# Patient Record
(demographics unavailable — no encounter records)

---

## 2025-03-31 NOTE — HISTORY OF PRESENT ILLNESS
[FreeTextEntry1] : 61 year old woman with past medical history migraines referred by Dr. Ashley Gupta for neurosurgical evaluation of cerebral aneurysm.  The patient primarily speaks Telugu. Translation is provided via [employer], ID #  Ms. Menjivar states that she saw ENT MD due to RIGHT ear tinnitus and decreased hearing in RIGHT ear. Workup included MRA head without contrast which was done on 8/11/23 and demonstrated a 5 mm LEFT ICA aneurysm.  Denies family history of aneurysms. Denies smoking history.  Returns to review CTA Head with and without contrast done on 9/5/23. Continues to report headaches, unrelieved by gabapentin.  3/18/2024- returns to review MRA, aneurysm is stable. Continues to have daily headaches, follows with Dr. Lott. Taking nortriptyline and rizatriptan without relief.  Recommended repeat MRA head in 1 year for aneurysm surveillance.   TODAY: presents to review annual MRA; aneurysm is stable.

## 2025-03-31 NOTE — PHYSICAL EXAM
[General Appearance - Alert] : alert [General Appearance - In No Acute Distress] : in no acute distress [Person] : oriented to person [Place] : oriented to place [Time] : oriented to time [Short Term Intact] : short term memory intact [Remote Intact] : remote memory intact [Span Intact] : the attention span was normal [Concentration Intact] : normal concentrating ability [Fluency] : fluency intact [Comprehension] : comprehension intact [Current Events] : adequate knowledge of current events [Past History] : adequate knowledge of personal past history [Vocabulary] : adequate range of vocabulary [Cranial Nerves Optic (II)] : visual acuity intact bilaterally,  pupils equal round and reactive to light [Cranial Nerves Oculomotor (III)] : extraocular motion intact [Cranial Nerves Trigeminal (V)] : facial sensation intact symmetrically [Cranial Nerves Facial (VII)] : face symmetrical [Cranial Nerves Vestibulocochlear (VIII)] : hearing was intact bilaterally [Cranial Nerves Glossopharyngeal (IX)] : tongue and palate midline [Cranial Nerves Accessory (XI - Cranial And Spinal)] : head turning and shoulder shrug symmetric [Cranial Nerves Hypoglossal (XII)] : there was no tongue deviation with protrusion [Motor Tone] : muscle tone was normal in all four extremities [Motor Strength] : muscle strength was normal in all four extremities [No Muscle Atrophy] : normal bulk in all four extremities [Sensation Tactile Decrease] : light touch was intact [Abnormal Walk] : normal gait [Balance] : balance was intact [2+] : Patella left 2+ [Over the Past 2 Weeks, Have You Felt Down, Depressed, or Hopeless?] : 1.) Over the past 2 weeks, have you felt down, depressed, or hopeless? No [Over the Past 2 Weeks, Have You Felt Little Interest or Pleasure Doing Things?] : 2.) Over the past 2 weeks, have you felt little interest or pleasure doing things? No [Past-pointing] : there was no past-pointing [Tremor] : no tremor present

## 2025-03-31 NOTE — HISTORY OF PRESENT ILLNESS
[FreeTextEntry1] : 61 year old woman with past medical history migraines referred by Dr. Ashley Gupta for neurosurgical evaluation of cerebral aneurysm.  The patient primarily speaks Setswana. Translation is provided via [employer], ID #  Ms. Menjivar states that she saw ENT MD due to RIGHT ear tinnitus and decreased hearing in RIGHT ear. Workup included MRA head without contrast which was done on 8/11/23 and demonstrated a 5 mm LEFT ICA aneurysm.  Denies family history of aneurysms. Denies smoking history.  Returns to review CTA Head with and without contrast done on 9/5/23. Continues to report headaches, unrelieved by gabapentin.  3/18/2024- returns to review MRA, aneurysm is stable. Continues to have daily headaches, follows with Dr. Lott. Taking nortriptyline and rizatriptan without relief.  Recommended repeat MRA head in 1 year for aneurysm surveillance.   TODAY: presents to review annual MRA; aneurysm is stable.

## 2025-03-31 NOTE — DATA REVIEWED
[de-identified] : [id number] EXAM: CT ANGIO BRAIN (W)AW IC ORDERED BY: LEXX DELGADO  PROCEDURE DATE: 09/05/2023    INTERPRETATION: CLINICAL INDICATIONS:LEFT ICA ANEURYSM  TECHNIQUE: CTA brain and neck. 80cc Isovue-370 Intravenous contrast was administered. 2-D MIP images. 20cc of contrast was discarded.  COMPARISON: MRA brain dated 8/5/2023  FINDINGS:  CTA BRAIN: Left supraclinoid internal carotid artery posterior inferiorly oriented aneurysm on image 30 of series 8 measures 4.2 x 4.7 x 4.7 mm, unchanged as compared to the recent MRI of the brain. The [place] of [place] and vertebrobasilar system are otherwise unremarkable without evidence of stenosis, occlusion or other saccular aneurysm dilation. No evidence for arterial venous malformation. The vertebral arteries are codominant.  Mild irregularity involving the right cervical internal carotid artery on image 37 of series 9 measuring suggest fibromuscular dysplasia.   IMPRESSION:  Left supraclinoid internal carotid artery posterior inferiorly oriented aneurysm measures 4.2 x 4.7 x 4.7 mm.  --- End of Report ---    [de-identified] : @ [facility] 3/15/2025 EXAM:  MR ANGIOGRAPHY BRAIN WITHOUT CONTRAST  HISTORY:  Headaches. Aneurysm.  TECHNIQUE:  Magnetic resonance angiography of intracranial circulation was performed on a 3T MR unit centered at the [place] of [place] with three-dimensional time of flight technique and targeted maximum intensity projection reconstructions.   COMPARISON:  MRA brain 2/22/2024.  FINDINGS:    Anterior circulation: Stable 6 mm inferiorly projecting left paraclinoid ICA aneurysm. Otherwise no evidence for large vessel occlusion, high grade stenosis, or large aneurysm. Posterior circulation: No evidence for large vessel occlusion, high grade stenosis, or large aneurysm.  IMPRESSION:  Stable 6 mm inferiorly projecting left paraclinoid ICA aneurysm.   Thank you for the opportunity to participate in the care of this patient.      @ [facility] 2- EXAM:  MR ANGIOGRAPHY BRAIN WITHOUT CONTRAST  HISTORY: Headache.  TECHNIQUE:  Magnetic resonance angiography of intracranial circulation was performed on a 3T MR unit centered at the [place] of [place] with three-dimensional time of flight technique and targeted maximum intensity projection reconstructions.   COMPARISON: 08/05/2023 MRA brain.  FINDINGS: 4 x 4 mm aneurysm arises off the undersurface of the proximal LEFT supraclinoid ICA and remains stable. There is no new aneurysm demonstrated. Vertebrobasilar junctions are patent, BILATERALLY. Codominant vertebral artery morphology.  IMPRESSION: 1.  Stable 4 x 4 mm aneurysm, proximal LEFT supraclinoid ICA.   Thank you for the opportunity to participate in the care of this patient.

## 2025-03-31 NOTE — HISTORY OF PRESENT ILLNESS
[FreeTextEntry1] : 61 year old woman with past medical history migraines referred by Dr. Ashley Gupta for neurosurgical evaluation of cerebral aneurysm.  The patient primarily speaks Vietnamese. Translation is provided via [employer], ID #  Ms. Menjivar states that she saw ENT MD due to RIGHT ear tinnitus and decreased hearing in RIGHT ear. Workup included MRA head without contrast which was done on 8/11/23 and demonstrated a 5 mm LEFT ICA aneurysm.  Denies family history of aneurysms. Denies smoking history.  Returns to review CTA Head with and without contrast done on 9/5/23. Continues to report headaches, unrelieved by gabapentin.  3/18/2024- returns to review MRA, aneurysm is stable. Continues to have daily headaches, follows with Dr. Lott. Taking nortriptyline and rizatriptan without relief.  Recommended repeat MRA head in 1 year for aneurysm surveillance.   TODAY: presents to review annual MRA; aneurysm is stable.

## 2025-03-31 NOTE — ASSESSMENT
[FreeTextEntry1] :  I have discussed the natural history and treatment options for aneurysms with the patient. I explained that a variety of factors must be taken into consideration when managing cerebral aneurysm, namely the lesion size, shape, location, and personal/family history, and patient's age. I informed the patient that the main risk with cerebral aneurysms is rupture, leading to Subarachnoid Hemorrhage. I detailed the consequences of SAH, including its morbidity and mortality. I then presented the options of 1) conservative management with serial imaging, 2) aneurysm repair via endovascular approaches vs microsurgery.  I thoroughly discussed the indications, risks, possible complications, and expected outcomes of each treatment option with the patient. In the end, I recommend she undergo a cerebral angiogram to better assess the 6mm L paraclinoid ICA aneurysm.  - referred to Dr. Gonzalez - [facility] to review cerebral angiogram with Dr. Underwood after completed  Patient and patient's family verbalize agreement and understanding with plan of care.  I, Dr. Domingo Underwood, personally performed the evaluation and management (E/M) services for this established patient who presents today with (a) new problem(s)/exacerbation of (an) existing condition(s). That E/M includes conducting the clinically appropriate interval history &/or exam, assessing all new/exacerbated conditions, and establishing a new plan of care. Today, my BEN, Chiki Miranda, was here to observe my evaluation and management service for this new problem/exacerbated condition and follow the plan of care established by me going forward.

## 2025-03-31 NOTE — DATA REVIEWED
[de-identified] : [id number] EXAM: CT ANGIO BRAIN (W)AW IC ORDERED BY: LEXX DELGADO  PROCEDURE DATE: 09/05/2023    INTERPRETATION: CLINICAL INDICATIONS:LEFT ICA ANEURYSM  TECHNIQUE: CTA brain and neck. 80cc Isovue-370 Intravenous contrast was administered. 2-D MIP images. 20cc of contrast was discarded.  COMPARISON: MRA brain dated 8/5/2023  FINDINGS:  CTA BRAIN: Left supraclinoid internal carotid artery posterior inferiorly oriented aneurysm on image 30 of series 8 measures 4.2 x 4.7 x 4.7 mm, unchanged as compared to the recent MRI of the brain. The [place] of [place] and vertebrobasilar system are otherwise unremarkable without evidence of stenosis, occlusion or other saccular aneurysm dilation. No evidence for arterial venous malformation. The vertebral arteries are codominant.  Mild irregularity involving the right cervical internal carotid artery on image 37 of series 9 measuring suggest fibromuscular dysplasia.   IMPRESSION:  Left supraclinoid internal carotid artery posterior inferiorly oriented aneurysm measures 4.2 x 4.7 x 4.7 mm.  --- End of Report ---    [de-identified] : @ [facility] 3/15/2025 EXAM:  MR ANGIOGRAPHY BRAIN WITHOUT CONTRAST  HISTORY:  Headaches. Aneurysm.  TECHNIQUE:  Magnetic resonance angiography of intracranial circulation was performed on a 3T MR unit centered at the [place] of [place] with three-dimensional time of flight technique and targeted maximum intensity projection reconstructions.   COMPARISON:  MRA brain 2/22/2024.  FINDINGS:    Anterior circulation: Stable 6 mm inferiorly projecting left paraclinoid ICA aneurysm. Otherwise no evidence for large vessel occlusion, high grade stenosis, or large aneurysm. Posterior circulation: No evidence for large vessel occlusion, high grade stenosis, or large aneurysm.  IMPRESSION:  Stable 6 mm inferiorly projecting left paraclinoid ICA aneurysm.   Thank you for the opportunity to participate in the care of this patient.      @ [facility] 2- EXAM:  MR ANGIOGRAPHY BRAIN WITHOUT CONTRAST  HISTORY: Headache.  TECHNIQUE:  Magnetic resonance angiography of intracranial circulation was performed on a 3T MR unit centered at the [place] of [place] with three-dimensional time of flight technique and targeted maximum intensity projection reconstructions.   COMPARISON: 08/05/2023 MRA brain.  FINDINGS: 4 x 4 mm aneurysm arises off the undersurface of the proximal LEFT supraclinoid ICA and remains stable. There is no new aneurysm demonstrated. Vertebrobasilar junctions are patent, BILATERALLY. Codominant vertebral artery morphology.  IMPRESSION: 1.  Stable 4 x 4 mm aneurysm, proximal LEFT supraclinoid ICA.   Thank you for the opportunity to participate in the care of this patient.

## 2025-03-31 NOTE — ASSESSMENT
[FreeTextEntry1] :  I have discussed the natural history and treatment options for aneurysms with the patient. I explained that a variety of factors must be taken into consideration when managing cerebral aneurysm, namely the lesion size, shape, location, and personal/family history, and patient's age. I informed the patient that the main risk with cerebral aneurysms is rupture, leading to Subarachnoid Hemorrhage. I detailed the consequences of SAH, including its morbidity and mortality. I then presented the options of 1) conservative management with serial imaging, 2) aneurysm repair via endovascular approaches vs microsurgery.  I thoroughly discussed the indications, risks, possible complications, and expected outcomes of each treatment option with the patient. In the end, I recommend she undergo a cerebral angiogram to better assess the 6mm L paraclinoid ICA aneurysm.  - referred to Dr. Gonzalez - [facility] to review cerebral angiogram with Dr. Underwood after completed  Patient and patient's family verbalize agreement and understanding with plan of care.  I, Dr. Domingo Underwood, personally performed the evaluation and management (E/M) services for this established patient who presents today with (a) new problem(s)/exacerbation of (an) existing condition(s). That E/M includes conducting the clinically appropriate interval history &/or exam, assessing all new/exacerbated conditions, and establishing a new plan of care. Today, my BEN, Chiki Miranda, was here to observe my evaluation and management service for this new problem/exacerbated condition and follow the plan of care established by me going forward.

## 2025-03-31 NOTE — ASSESSMENT
[FreeTextEntry1] :  I have discussed the natural history and treatment options for aneurysms with the patient. I explained that a variety of factors must be taken into consideration when managing cerebral aneurysm, namely the lesion size, shape, location, and personal/family history, and patient's age. I informed the patient that the main risk with cerebral aneurysms is rupture, leading to Subarachnoid Hemorrhage. I detailed the consequences of SAH, including its morbidity and mortality. I then presented the options of 1) conservative management with serial imaging, 2) aneurysm repair via endovascular approaches vs microsurgery.  I thoroughly discussed the indications, risks, possible complications, and expected outcomes of each treatment option with the patient. In the end, I recommend she undergo a cerebral angiogram to better assess the 6mm L paraclinoid ICA aneurysm.  - referred to Dr. Gonzalez - [facility] to review cerebral angiogram with Dr. Underwood after completed  Patient and patient's family verbalize agreement and understanding with plan of care.  I, Dr. Domingo Underwood, personally performed the evaluation and management (E/M) services for this established patient who presents today with (a) new problem(s)/exacerbation of (an) existing condition(s). That E/M includes conducting the clinically appropriate interval history &/or exam, assessing all new/exacerbated conditions, and establishing a new plan of care. Today, my BEN, Chiki Miranda, was here to observe my evaluation and management service for this new problem/exacerbated condition and follow the plan of care established by me going forward.

## 2025-03-31 NOTE — DATA REVIEWED
[de-identified] : [id number] EXAM: CT ANGIO BRAIN (W)AW IC ORDERED BY: ELXX DELGADO  PROCEDURE DATE: 09/05/2023    INTERPRETATION: CLINICAL INDICATIONS:LEFT ICA ANEURYSM  TECHNIQUE: CTA brain and neck. 80cc Isovue-370 Intravenous contrast was administered. 2-D MIP images. 20cc of contrast was discarded.  COMPARISON: MRA brain dated 8/5/2023  FINDINGS:  CTA BRAIN: Left supraclinoid internal carotid artery posterior inferiorly oriented aneurysm on image 30 of series 8 measures 4.2 x 4.7 x 4.7 mm, unchanged as compared to the recent MRI of the brain. The [place] of [place] and vertebrobasilar system are otherwise unremarkable without evidence of stenosis, occlusion or other saccular aneurysm dilation. No evidence for arterial venous malformation. The vertebral arteries are codominant.  Mild irregularity involving the right cervical internal carotid artery on image 37 of series 9 measuring suggest fibromuscular dysplasia.   IMPRESSION:  Left supraclinoid internal carotid artery posterior inferiorly oriented aneurysm measures 4.2 x 4.7 x 4.7 mm.  --- End of Report ---    [de-identified] : @ [facility] 3/15/2025 EXAM:  MR ANGIOGRAPHY BRAIN WITHOUT CONTRAST  HISTORY:  Headaches. Aneurysm.  TECHNIQUE:  Magnetic resonance angiography of intracranial circulation was performed on a 3T MR unit centered at the [place] of [place] with three-dimensional time of flight technique and targeted maximum intensity projection reconstructions.   COMPARISON:  MRA brain 2/22/2024.  FINDINGS:    Anterior circulation: Stable 6 mm inferiorly projecting left paraclinoid ICA aneurysm. Otherwise no evidence for large vessel occlusion, high grade stenosis, or large aneurysm. Posterior circulation: No evidence for large vessel occlusion, high grade stenosis, or large aneurysm.  IMPRESSION:  Stable 6 mm inferiorly projecting left paraclinoid ICA aneurysm.   Thank you for the opportunity to participate in the care of this patient.      @ [facility] 2- EXAM:  MR ANGIOGRAPHY BRAIN WITHOUT CONTRAST  HISTORY: Headache.  TECHNIQUE:  Magnetic resonance angiography of intracranial circulation was performed on a 3T MR unit centered at the [place] of [place] with three-dimensional time of flight technique and targeted maximum intensity projection reconstructions.   COMPARISON: 08/05/2023 MRA brain.  FINDINGS: 4 x 4 mm aneurysm arises off the undersurface of the proximal LEFT supraclinoid ICA and remains stable. There is no new aneurysm demonstrated. Vertebrobasilar junctions are patent, BILATERALLY. Codominant vertebral artery morphology.  IMPRESSION: 1.  Stable 4 x 4 mm aneurysm, proximal LEFT supraclinoid ICA.   Thank you for the opportunity to participate in the care of this patient.

## 2025-03-31 NOTE — HISTORY OF PRESENT ILLNESS
[FreeTextEntry1] : 61 year old woman with past medical history migraines referred by Dr. Ashley Gupta for neurosurgical evaluation of cerebral aneurysm.  The patient primarily speaks Faroese. Translation is provided via [employer], ID #  Ms. Menjivar states that she saw ENT MD due to RIGHT ear tinnitus and decreased hearing in RIGHT ear. Workup included MRA head without contrast which was done on 8/11/23 and demonstrated a 5 mm LEFT ICA aneurysm.  Denies family history of aneurysms. Denies smoking history.  Returns to review CTA Head with and without contrast done on 9/5/23. Continues to report headaches, unrelieved by gabapentin.  3/18/2024- returns to review MRA, aneurysm is stable. Continues to have daily headaches, follows with Dr. Lott. Taking nortriptyline and rizatriptan without relief.  Recommended repeat MRA head in 1 year for aneurysm surveillance.   TODAY: presents to review annual MRA; aneurysm is stable.

## 2025-03-31 NOTE — ASSESSMENT
[FreeTextEntry1] :  I have discussed the natural history and treatment options for aneurysms with the patient. I explained that a variety of factors must be taken into consideration when managing cerebral aneurysm, namely the lesion size, shape, location, and personal/family history, and patient's age. I informed the patient that the main risk with cerebral aneurysms is rupture, leading to Subarachnoid Hemorrhage. I detailed the consequences of SAH, including its morbidity and mortality. I then presented the options of 1) conservative management with serial imaging, 2) aneurysm repair via endovascular approaches vs microsurgery.  I thoroughly discussed the indications, risks, possible complications, and expected outcomes of each treatment option with the patient. In the end, I recommend she undergo a cerebral angiogram to better assess the 6mm L paraclinoid ICA aneurysm.  - referred to Dr. Gonzalez - [facility] to review cerebral angiogram with Dr. Underwood after completed  Patient and patient's family verbalize agreement and understanding with plan of care.  I, Dr. Domingo Underwood, personally performed the evaluation and management (E/M) services for this established patient who presents today with (a) new problem(s)/exacerbation of (an) existing condition(s). That E/M includes conducting the clinically appropriate interval history &/or exam, assessing all new/exacerbated conditions, and establishing a new plan of care. Today, my BEN, Chiki Miranda, was here to observe my evaluation and management service for this new problem/exacerbated condition and follow the plan of care established by me going forward.

## 2025-03-31 NOTE — DATA REVIEWED
[de-identified] : [id number] EXAM: CT ANGIO BRAIN (W)AW IC ORDERED BY: LEXX DELGADO  PROCEDURE DATE: 09/05/2023    INTERPRETATION: CLINICAL INDICATIONS:LEFT ICA ANEURYSM  TECHNIQUE: CTA brain and neck. 80cc Isovue-370 Intravenous contrast was administered. 2-D MIP images. 20cc of contrast was discarded.  COMPARISON: MRA brain dated 8/5/2023  FINDINGS:  CTA BRAIN: Left supraclinoid internal carotid artery posterior inferiorly oriented aneurysm on image 30 of series 8 measures 4.2 x 4.7 x 4.7 mm, unchanged as compared to the recent MRI of the brain. The [place] of [place] and vertebrobasilar system are otherwise unremarkable without evidence of stenosis, occlusion or other saccular aneurysm dilation. No evidence for arterial venous malformation. The vertebral arteries are codominant.  Mild irregularity involving the right cervical internal carotid artery on image 37 of series 9 measuring suggest fibromuscular dysplasia.   IMPRESSION:  Left supraclinoid internal carotid artery posterior inferiorly oriented aneurysm measures 4.2 x 4.7 x 4.7 mm.  --- End of Report ---    [de-identified] : @ [facility] 3/15/2025 EXAM:  MR ANGIOGRAPHY BRAIN WITHOUT CONTRAST  HISTORY:  Headaches. Aneurysm.  TECHNIQUE:  Magnetic resonance angiography of intracranial circulation was performed on a 3T MR unit centered at the [place] of [place] with three-dimensional time of flight technique and targeted maximum intensity projection reconstructions.   COMPARISON:  MRA brain 2/22/2024.  FINDINGS:    Anterior circulation: Stable 6 mm inferiorly projecting left paraclinoid ICA aneurysm. Otherwise no evidence for large vessel occlusion, high grade stenosis, or large aneurysm. Posterior circulation: No evidence for large vessel occlusion, high grade stenosis, or large aneurysm.  IMPRESSION:  Stable 6 mm inferiorly projecting left paraclinoid ICA aneurysm.   Thank you for the opportunity to participate in the care of this patient.      @ [facility] 2- EXAM:  MR ANGIOGRAPHY BRAIN WITHOUT CONTRAST  HISTORY: Headache.  TECHNIQUE:  Magnetic resonance angiography of intracranial circulation was performed on a 3T MR unit centered at the [place] of [place] with three-dimensional time of flight technique and targeted maximum intensity projection reconstructions.   COMPARISON: 08/05/2023 MRA brain.  FINDINGS: 4 x 4 mm aneurysm arises off the undersurface of the proximal LEFT supraclinoid ICA and remains stable. There is no new aneurysm demonstrated. Vertebrobasilar junctions are patent, BILATERALLY. Codominant vertebral artery morphology.  IMPRESSION: 1.  Stable 4 x 4 mm aneurysm, proximal LEFT supraclinoid ICA.   Thank you for the opportunity to participate in the care of this patient.

## 2025-04-10 NOTE — HISTORY OF PRESENT ILLNESS
[de-identified] : KARLA RANDOLPH is an Frisian-speaking 63 year-old female with a PMHx significant for migraines, who presents to the office today for neurovascular evaluation of LEFT Paraclinoid ICA aneurysm   2022 Patient saw ENT Dr. Gupta for decreased hearing in the RIGHT ear with b/l non-pulsatile whooshing tinnitus. ENT ordered MRA Brain (8/11/23) which revealed 5mm LEFT ICA aneurysm. She was referred to Neurosurgery Dr. Underwood who ordered CTA Head wawo (9/05/23) which demonstrated LEFT Supraclinoid ICA postero-inferiorly oriented aneurysm measuring 4.2 x 4.7 x 4.7 mm. diagnostic cerebral angiogram was offered but patient ultimately opted for conservative management with imaging surveillance. Subsequent f/u MRA Brains (9/05/23 and 2/22/24) showed stability of aneurysm. MRA Brain (3/15/25 @[facility]) however demonstrated enlarged 6 mm inferiorly projecting left Paraclinoid ICA aneurysm. Dr. Underwood referred patient to Dr. Gonzalez for further angiographic evaluation.   TODAY 4/10/25 - first appt with Dr. Gonzalez. Patient endorses ongoing tinnitus in b/l ears, worst at nighttime when she is laying down. Decreased hearing in R ear unchanged. She also endorses 4yr intermittent dizziness like the room is spinning with disequilibrium, last episode last summer. Patient reportedly told PCP who told patient to go to ED, which she has gone 2x thus far. They "gave me a pill and I felt better." She does not know what they gave her. Patient denies headache, cervicalgia, nausea/vomiting, visual disturbance, numbness/tingling, extremity weakness, or seizure-like activity.    Soc Hx: Never smoker, No EtOH, No known family history of aneurysms   - Care Team -  PCP: Dr. Myrna Martínez [address] [place] [id number] [phone]  Neuro: (stopped seeing Dr. Lott, saw a Neurologist in [facility] who has been prescribing migraine and antidepressants for her)  ENT: Dr. Ashley Gupta

## 2025-04-10 NOTE — HISTORY OF PRESENT ILLNESS
[de-identified] : KARLA RANDOLPH is an Thai-speaking 63 year-old female with a PMHx significant for migraines, who presents to the office today for neurovascular evaluation of LEFT Paraclinoid ICA aneurysm   2022 Patient saw ENT Dr. Gupta for decreased hearing in the RIGHT ear with b/l non-pulsatile whooshing tinnitus. ENT ordered MRA Brain (8/11/23) which revealed 5mm LEFT ICA aneurysm. She was referred to Neurosurgery Dr. Underwood who ordered CTA Head wawo (9/05/23) which demonstrated LEFT Supraclinoid ICA postero-inferiorly oriented aneurysm measuring 4.2 x 4.7 x 4.7 mm. diagnostic cerebral angiogram was offered but patient ultimately opted for conservative management with imaging surveillance. Subsequent f/u MRA Brains (9/05/23 and 2/22/24) showed stability of aneurysm. MRA Brain (3/15/25 @[facility]) however demonstrated enlarged 6 mm inferiorly projecting left Paraclinoid ICA aneurysm. Dr. Underwood referred patient to Dr. Gonzalez for further angiographic evaluation.   TODAY 4/10/25 - first appt with Dr. Gonzalez. Patient endorses ongoing tinnitus in b/l ears, worst at nighttime when she is laying down. Decreased hearing in R ear unchanged. She also endorses 4yr intermittent dizziness like the room is spinning with disequilibrium, last episode last summer. Patient reportedly told PCP who told patient to go to ED, which she has gone 2x thus far. They "gave me a pill and I felt better." She does not know what they gave her. Patient denies headache, cervicalgia, nausea/vomiting, visual disturbance, numbness/tingling, extremity weakness, or seizure-like activity.    Soc Hx: Never smoker, No EtOH, No known family history of aneurysms   - Care Team -  PCP: Dr. Myrna Martínez [address] [place] [phone]  Neuro: (stopped seeing Dr. Lott, saw a Neurologist in [facility] who has been prescribing migraine and antidepressants for her)  ENT: Dr. Ashley Gupta

## 2025-04-10 NOTE — REASON FOR VISIT
[Referred By: _________] : Patient was referred by JAZMINE [Language Line ] : provided by Language Line   [FreeTextEntry1] : LEFT Paraclinoid ICA aneurysm [Interpreters_IDNumber] : [id number] [Interpreters_FullName] : Elinor

## 2025-04-10 NOTE — ASSESSMENT
[FreeTextEntry1] : KARLA RANDOLPH is an Belarusian-speaking 63 year-old female with a PMHx significant for migraines, who presents to the office today for neurovascular evaluation of LEFT Paraclinoid ICA aneurysm   I have discussed the natural history and treatment options for cerebral aneurysms with the patient. I explained that a variety of factors must be taken into consideration when managing cerebral aneurysm, namely the lesion size, shape, location, and personal/family history, and patient's age. I informed the patient that the main risk with cerebral aneurysms is rupture, leading to Subarachnoid Hemorrhage. I detailed the consequences of SAH, including its morbidity and mortality. I then presented the options of 1) conservative management with serial imaging, 2) aneurysm repair via endovascular approaches vs microsurgery. I thoroughly discussed the indications, risks, possible complications, and expected outcomes of each treatment option with the patient. In the end, I recommend proceeding with diagnostic cerebral angiogram  PLAN: - diagnostic angio 4/21/25 - optimization appt 4/17 - clearance packet given to patient - return to Dr. Gonzalez clinic to review   The patient was instructed that if they should immediately call 911 or go to the Emergency Department if they experience symptoms of severe thunderclap headache, syncope, unexplained nausea/vomiting, visual changes, seizure-like activity, new weakness or numbness of extremities.   I reviewed and answered all patient questions. Patient verbalizes agreement and understanding with plan of care. Patient verbalizes agreement and understanding with plan of care.   I, Dr. Gonzalez, personally performed the evaluation and management (E/M) services for this new patient. That E/M includes conducting the initial examination, assessing all conditions, and establishing the plan of care. Today, WALT Burnette was here to observe my evaluation and management services for this patient to be followed going forward.

## 2025-04-10 NOTE — HISTORY OF PRESENT ILLNESS
[de-identified] : KARLA RANDOLPH is an English-speaking 63 year-old female with a PMHx significant for migraines, who presents to the office today for neurovascular evaluation of LEFT Paraclinoid ICA aneurysm   2022 Patient saw ENT Dr. Gupta for decreased hearing in the RIGHT ear with b/l non-pulsatile whooshing tinnitus. ENT ordered MRA Brain (8/11/23) which revealed 5mm LEFT ICA aneurysm. She was referred to Neurosurgery Dr. Underwood who ordered CTA Head wawo (9/05/23) which demonstrated LEFT Supraclinoid ICA postero-inferiorly oriented aneurysm measuring 4.2 x 4.7 x 4.7 mm. diagnostic cerebral angiogram was offered but patient ultimately opted for conservative management with imaging surveillance. Subsequent f/u MRA Brains (9/05/23 and 2/22/24) showed stability of aneurysm. MRA Brain (3/15/25 @[facility]) however demonstrated enlarged 6 mm inferiorly projecting left Paraclinoid ICA aneurysm. Dr. Underwood referred patient to Dr. Gonzalez for further angiographic evaluation.   TODAY 4/10/25 - first appt with Dr. Gonzalez. Patient endorses ongoing tinnitus in b/l ears, worst at nighttime when she is laying down. Decreased hearing in R ear unchanged. She also endorses 4yr intermittent dizziness like the room is spinning with disequilibrium, last episode last summer. Patient reportedly told PCP who told patient to go to ED, which she has gone 2x thus far. They "gave me a pill and I felt better." She does not know what they gave her. Patient denies headache, cervicalgia, nausea/vomiting, visual disturbance, numbness/tingling, extremity weakness, or seizure-like activity.    Soc Hx: Never smoker, No EtOH, No known family history of aneurysms   - Care Team -  PCP: Dr. Myrna Martínez [address] [place] [phone]  Neuro: (stopped seeing Dr. Lott, saw a Neurologist in [facility] who has been prescribing migraine and antidepressants for her)  ENT: Dr. Ashley Gupta

## 2025-04-10 NOTE — ASSESSMENT
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 I explained that a variety of factors must be taken into consideration when managing cerebral aneurysm, namely the lesion size, shape, location, and personal/family history, and patient's age. I informed the patient that the main risk with cerebral aneurysms is rupture, leading to Subarachnoid Hemorrhage. I detailed the consequences of SAH, including its morbidity and mortality. I then presented the options of 1) conservative management with serial imaging, 2) aneurysm repair via endovascular approaches vs microsurgery. I thoroughly discussed the indications, risks, possible complications, and expected outcomes of each treatment option with the patient. In the end, I recommend proceeding with diagnostic cerebral angiogram  PLAN: - diagnostic angio 4/21/25 - optimization appt 4/17 - clearance packet given to patient - return to Dr. Gonzalez clinic to review   The patient was instructed that if they should immediately call 911 or go to the Emergency Department if they experience symptoms of severe thunderclap headache, syncope, unexplained nausea/vomiting, visual changes, seizure-like activity, new weakness or numbness of extremities.   I reviewed and answered all patient questions. Patient verbalizes agreement and understanding with plan of care. Patient verbalizes agreement and understanding with plan of care.   I, Dr. Gonzalez, personally performed the evaluation and management (E/M) services for this new patient. That E/M includes conducting the initial examination, assessing all conditions, and establishing the plan of care. Today, WALT Burnette was here to observe my evaluation and management services for this patient to be followed going forward.

## 2025-04-10 NOTE — REASON FOR VISIT
[Referred By: _________] : Patient was referred by JAZMINE [Language Line ] : provided by Language Line   [FreeTextEntry1] : LEFT Paraclinoid ICA aneurysm [Interpreters_IDNumber] : [id number] [Interpreters_FullName] : Elinor

## 2025-04-10 NOTE — REASON FOR VISIT
[Referred By: _________] : Patient was referred by JAZMINE [Language Line ] : provided by Language Line   [FreeTextEntry1] : LEFT Paraclinoid ICA aneurysm [Interpreters_IDNumber] : [id number] [Interpreters_FullName] : Elinor

## 2025-04-10 NOTE — PHYSICAL EXAM
[General Appearance - Alert] : alert [General Appearance - In No Acute Distress] : in no acute distress [General Appearance - Well Nourished] : well nourished [General Appearance - Well Developed] : well developed [Oriented To Time, Place, And Person] : oriented to person, place, and time [Motor Tone] : muscle tone was normal in all four extremities [Motor Strength] : muscle strength was normal in all four extremities [Abnormal Walk] : normal gait [Balance] : balance was intact [Sclera] : the sclera and conjunctiva were normal [Full Visual Field] : full visual field [Outer Ear] : the ears and nose were normal in appearance [Neck Appearance] : the appearance of the neck was normal [] : no respiratory distress [Heart Rate And Rhythm] : heart rate was normal and rhythm regular

## 2025-04-10 NOTE — ASSESSMENT
[FreeTextEntry1] : KARLA RANDOLPH is an Polish-speaking 63 year-old female with a PMHx significant for migraines, who presents to the office today for neurovascular evaluation of LEFT Paraclinoid ICA aneurysm   I have discussed the natural history and treatment options for cerebral aneurysms with the patient. I explained that a variety of factors must be taken into consideration when managing cerebral aneurysm, namely the lesion size, shape, location, and personal/family history, and patient's age. I informed the patient that the main risk with cerebral aneurysms is rupture, leading to Subarachnoid Hemorrhage. I detailed the consequences of SAH, including its morbidity and mortality. I then presented the options of 1) conservative management with serial imaging, 2) aneurysm repair via endovascular approaches vs microsurgery. I thoroughly discussed the indications, risks, possible complications, and expected outcomes of each treatment option with the patient. In the end, I recommend proceeding with diagnostic cerebral angiogram  PLAN: - diagnostic angio 4/21/25 - optimization appt 4/17 - clearance packet given to patient - return to Dr. Gonzalez clinic to review   The patient was instructed that if they should immediately call 911 or go to the Emergency Department if they experience symptoms of severe thunderclap headache, syncope, unexplained nausea/vomiting, visual changes, seizure-like activity, new weakness or numbness of extremities.   I reviewed and answered all patient questions. Patient verbalizes agreement and understanding with plan of care. Patient verbalizes agreement and understanding with plan of care.   I, Dr. Gonzalez, personally performed the evaluation and management (E/M) services for this new patient. That E/M includes conducting the initial examination, assessing all conditions, and establishing the plan of care. Today, WALT Burnette was here to observe my evaluation and management services for this patient to be followed going forward.

## 2025-04-21 NOTE — ASSESSMENT
[Patient Optimized for Surgery] : Patient optimized for surgery [No Further Testing Recommended] : no further testing recommended [FreeTextEntry4] : 63 yrs old F with pmx of migraines, cerebral aneurysm comes in for pre-op eval for diagnostic cerebral angiogram.  RCRI 0 points class I risk. Moderate functional status Pt is low risk for low/intermediate risk procedure. No interventions needed from medicine at this point of time.

## 2025-04-21 NOTE — HISTORY OF PRESENT ILLNESS
[No Pertinent Pulmonary History] : no history of asthma, COPD, sleep apnea, or smoking [(Patient denies any chest pain, claudication, dyspnea on exertion, orthopnea, palpitations or syncope)] : Patient denies any chest pain, claudication, dyspnea on exertion, orthopnea, palpitations or syncope [Moderate (4-6 METs)] : Moderate (4-6 METs) [No Pertinent Cardiac History] : no history of aortic stenosis, atrial fibrillation, coronary artery disease, recent myocardial infarction, or implantable device/pacemaker [No Adverse Anesthesia Reaction] : no adverse anesthesia reaction in self or family member [Chronic Anticoagulation] : no chronic anticoagulation [Chronic Kidney Disease] : no chronic kidney disease [Diabetes] : no diabetes [FreeTextEntry1] : diagnostic cerebral angiogram.  [FreeTextEntry4] : 63 yrs old F with pmx of migraines, cerebral aneurysm comes in for pre-op eval for diagnostic cerebral angiogram.  No new complains.  Denies any chest pain, cough, fevers, abd pain, vomiting, diarrhea, rash, joint pains, sob, palpitations. Moderate functional capacity: can walk more than 10 blocks without any problems No problems with anesthesia in the past. Denies any blood thinners or herbal meds use.  home meds: Paroxetine 10 mg OD Amitriptyline 25 mg OD Rizatriptan 10 mg OD Omeprazole 20 mg OD

## 2025-04-29 NOTE — PHYSICAL EXAM
Dr. Garcia's new patient labs = CBC, BMP, PO4, Vitamin D, PTH & UPCR.     Lab orders placed for 1/10/2024 lab appointment.     Nothing further needed at this time.   [General Appearance - Alert] : alert [General Appearance - In No Acute Distress] : in no acute distress [General Appearance - Well Nourished] : well nourished [General Appearance - Well Developed] : well developed [Oriented To Time, Place, And Person] : oriented to person, place, and time [Motor Tone] : muscle tone was normal in all four extremities [Motor Strength] : muscle strength was normal in all four extremities [Abnormal Walk] : normal gait [Balance] : balance was intact [Sclera] : the sclera and conjunctiva were normal [Full Visual Field] : full visual field [Outer Ear] : the ears and nose were normal in appearance [Neck Appearance] : the appearance of the neck was normal [] : no respiratory distress [Heart Rate And Rhythm] : heart rate was normal and rhythm regular

## 2025-05-01 NOTE — PROCEDURE
[FreeTextEntry1] : IR PROCEDURE NEURO ORDERED BY: HI DOAN 4/21/2025  INTERPRETATION: Surgeon: Dr. Bernardo Gonzalez Assistant: MD Cyrus; NICOL Beckham  Preoperative diagnosis: Left paraclinoid aneurysm Postoperative diagnosis: Left paraclinoid aneurysm  Procedure: Ultrasound-guided access of the right common femoral artery Cerebral and cervical angiogram 3-D spin angiogram with injections of the left internal carotid artery with postprocessing on a separate workstation by the interpreting physician  Clinical history: 62-year-old female with past medical history significant for migraine, decreased hearing in the right side with bilateral nonpulsatile tinnitus. 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 Patient comes today for cerebral angiogram to further evaluate.  Anesthesia: Monitored anesthesia care was provided by the anesthesiology service for a total of 60 minutes.  Estimated blood loss: 10 cc  3-D rotational angiography was performed via injections of the following vessels: left internal carotid artery  Complications: No complications encountered during or immediately following the procedure.  Procedure: The right groin was prepped and draped using a sterile technique.  Ultrasound-guided access of the right common femoral artery: Ultrasound images demonstrated patency of the right common femoral artery and its branches. The subcutaneous tissues overlying the right common femoral artery were locally anesthetized with lidocaine localized. Utilizing a standard arterial micropuncture kit with ultrasound guidance, the right common femoral artery was accessed and a 5 Kiswahili 11 cm slender sheath was placed.  Utilizing a 0.035 angled Glidewire, 4 Kiswahili vertebral curve catheter, selective arterial catheterizations were performed as detailed above. Nonionic contrast was utilized throughout the procedure for the patient's safety.  At the completion of the procedure, the catheter and sheath were removed and hemostasis in the right wrist obtained by placement of a Vascade arteriotomy closure device. The device was deployed without complication.  Findings of the cerebral angiogram: Injections of the right common femoral artery demonstrated patency of the right common femoral artery and its branches. The catheter was advanced into the right common carotid artery. Injections of the right common carotid artery demonstrate patency of the carotid bifurcation. The catheter was advanced into the right internal carotid artery. Injections of the right internal carotid artery demonstrate unremarkable appearance of the cerebral vasculature with no aneurysm, large vessel occlusion, vascular formation or other vascular anomaly. Arterial, parenchymal and venous phase of the angiogram appear normal. The catheter was advanced into the left common carotid artery. Injections of the left common carotid artery demonstrate patency of the carotid bifurcation. The catheter was advanced into the left internal carotid artery. Injections of the left internal carotid artery demonstrate a posteriorly and inferiorly oriented, 5.9 x 5.5 x 3 mm aneurysm arising along the posterior wall of the internal carotid artery distal to the ophthalmic artery origin with a relatively wide, 5.5 mm neck. These findings are better depicted on the 3-D rotational angiography images. Remaining of the angiogram demonstrates an unremarkable appearance of the cerebral vasculature with no additional aneurysm, large vessel occlusion, vascular formation or other vascular anomaly. Arterial, parenchymal and venous phase of the angiogram appear normal.  The catheter was advanced into the left vertebral artery. Injections of the left vertebral artery demonstrate unremarkable appearance of the cerebral vasculature with no aneurysm, large vessel occlusion, vascular formation or other vascular anomaly. Arterial, parenchymal and venous phase of the angiogram appear normal.  Summary of the procedure: Wide neck, 5.9 mm aneurysm arising from the posterior wall of the left internal carotid artery distal to the ophthalmic artery origin.

## 2025-05-01 NOTE — HISTORY OF PRESENT ILLNESS
[FreeTextEntry1] : KARLA RANDOLPH is an Hebrew-speaking 63 year-old female with a PMHx significant for migraines, who presents to the office today for postop evaluation diagnostic cerebral angiogram of LEFT Paraclinoid ICA aneurysm  2022 Patient saw ENT Dr. Gupta for decreased hearing in the RIGHT ear with b/l non-pulsatile whooshing tinnitus. ENT ordered MRA Brain (8/11/23) which revealed 5mm LEFT ICA aneurysm. She was referred to Neurosurgery Dr. Underwood who ordered CTA Head wawo (9/05/23) which demonstrated LEFT Supraclinoid ICA postero-inferiorly oriented aneurysm measuring 4.2 x 4.7 x 4.7 mm. diagnostic cerebral angiogram was offered but patient ultimately opted for conservative management with imaging surveillance. Subsequent f/u MRA Brains (9/05/23 and 2/22/24) showed stability of aneurysm. MRA Brain (3/15/25 @[facility]) however demonstrated enlarged 6 mm inferiorly projecting left Paraclinoid ICA aneurysm. Dr. Underwood referred patient to Dr. Gonzalez for further angiographic evaluation.  4/10/25 - first appt with Dr. Gonzalez. Patient endorses ongoing tinnitus in b/l ears, worst at nighttime when she is laying down. Decreased hearing in R ear unchanged. She also endorses 4yr intermittent dizziness like the room is spinning with disequilibrium, last episode last summer. Patient reportedly told PCP who told patient to go to ED, which she has gone 2x thus far. They "gave me a pill and I felt better." She does not know what they gave her. Patient denies headache, cervicalgia, nausea/vomiting, visual disturbance, numbness/tingling, extremity weakness, or seizure-like activity. Plan for diagnostic cerebral angiogram  4/21/25 - Diagnostic cerebral angio by Dr. Gonzalez  TODAY 4/29/25 - postop appt with Dr. Gonzalez. States she feels fine, has a small bump in R groin with ecchymosis. Patient denies headache, cervicalgia, nausea/vomiting, visual disturbance, numbness/tingling, extremity weakness, or seizure-like activity.   Soc Hx: Never smoker, No EtOH, No known family history of aneurysms  - Care Team - PCP: Dr. Myrna Martínez [address] [place] [phone]  Neuro: (stopped seeing Dr. Lott, saw a Neurologist in [facility] who has been prescribing migraine and antidepressants for her)  ENT: Dr. Ashley Gupta

## 2025-05-01 NOTE — PROCEDURE
[FreeTextEntry1] : IR PROCEDURE NEURO ORDERED BY: HI DOAN 4/21/2025  INTERPRETATION: Surgeon: Dr. Bernardo Gonzalez Assistant: MD Cyrus; NICOL Beckham  Preoperative diagnosis: Left paraclinoid aneurysm Postoperative diagnosis: Left paraclinoid aneurysm  Procedure: Ultrasound-guided access of the right common femoral artery Cerebral and cervical angiogram 3-D spin angiogram with injections of the left internal carotid artery with postprocessing on a separate workstation by the interpreting physician  Clinical history: 62-year-old female with past medical history significant for migraine, decreased hearing in the right side with bilateral nonpulsatile tinnitus. 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 Patient comes today for cerebral angiogram to further evaluate.  Anesthesia: Monitored anesthesia care was provided by the anesthesiology service for a total of 60 minutes.  Estimated blood loss: 10 cc  3-D rotational angiography was performed via injections of the following vessels: left internal carotid artery  Complications: No complications encountered during or immediately following the procedure.  Procedure: The right groin was prepped and draped using a sterile technique.  Ultrasound-guided access of the right common femoral artery: Ultrasound images demonstrated patency of the right common femoral artery and its branches. The subcutaneous tissues overlying the right common femoral artery were locally anesthetized with lidocaine localized. Utilizing a standard arterial micropuncture kit with ultrasound guidance, the right common femoral artery was accessed and a 5 Maori 11 cm slender sheath was placed.  Utilizing a 0.035 angled Glidewire, 4 Maori vertebral curve catheter, selective arterial catheterizations were performed as detailed above. Nonionic contrast was utilized throughout the procedure for the patient's safety.  At the completion of the procedure, the catheter and sheath were removed and hemostasis in the right wrist obtained by placement of a Vascade arteriotomy closure device. The device was deployed without complication.  Findings of the cerebral angiogram: Injections of the right common femoral artery demonstrated patency of the right common femoral artery and its branches. The catheter was advanced into the right common carotid artery. Injections of the right common carotid artery demonstrate patency of the carotid bifurcation. The catheter was advanced into the right internal carotid artery. Injections of the right internal carotid artery demonstrate unremarkable appearance of the cerebral vasculature with no aneurysm, large vessel occlusion, vascular formation or other vascular anomaly. Arterial, parenchymal and venous phase of the angiogram appear normal. The catheter was advanced into the left common carotid artery. Injections of the left common carotid artery demonstrate patency of the carotid bifurcation. The catheter was advanced into the left internal carotid artery. Injections of the left internal carotid artery demonstrate a posteriorly and inferiorly oriented, 5.9 x 5.5 x 3 mm aneurysm arising along the posterior wall of the internal carotid artery distal to the ophthalmic artery origin with a relatively wide, 5.5 mm neck. These findings are better depicted on the 3-D rotational angiography images. Remaining of the angiogram demonstrates an unremarkable appearance of the cerebral vasculature with no additional aneurysm, large vessel occlusion, vascular formation or other vascular anomaly. Arterial, parenchymal and venous phase of the angiogram appear normal.  The catheter was advanced into the left vertebral artery. Injections of the left vertebral artery demonstrate unremarkable appearance of the cerebral vasculature with no aneurysm, large vessel occlusion, vascular formation or other vascular anomaly. Arterial, parenchymal and venous phase of the angiogram appear normal.  Summary of the procedure: Wide neck, 5.9 mm aneurysm arising from the posterior wall of the left internal carotid artery distal to the ophthalmic artery origin.

## 2025-05-01 NOTE — PROCEDURE
[FreeTextEntry1] : IR PROCEDURE NEURO ORDERED BY: HI DOAN 4/21/2025  INTERPRETATION: Surgeon: Dr. Bernardo Gonzalez Assistant: MD Cyrus; NICOL Beckham  Preoperative diagnosis: Left paraclinoid aneurysm Postoperative diagnosis: Left paraclinoid aneurysm  Procedure: Ultrasound-guided access of the right common femoral artery Cerebral and cervical angiogram 3-D spin angiogram with injections of the left internal carotid artery with postprocessing on a separate workstation by the interpreting physician  Clinical history: 62-year-old female with past medical history significant for migraine, decreased hearing in the right side with bilateral nonpulsatile tinnitus. 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 Patient comes today for cerebral angiogram to further evaluate.  Anesthesia: Monitored anesthesia care was provided by the anesthesiology service for a total of 60 minutes.  Estimated blood loss: 10 cc  3-D rotational angiography was performed via injections of the following vessels: left internal carotid artery  Complications: No complications encountered during or immediately following the procedure.  Procedure: The right groin was prepped and draped using a sterile technique.  Ultrasound-guided access of the right common femoral artery: Ultrasound images demonstrated patency of the right common femoral artery and its branches. The subcutaneous tissues overlying the right common femoral artery were locally anesthetized with lidocaine localized. Utilizing a standard arterial micropuncture kit with ultrasound guidance, the right common femoral artery was accessed and a 5 Syriac 11 cm slender sheath was placed.  Utilizing a 0.035 angled Glidewire, 4 Syriac vertebral curve catheter, selective arterial catheterizations were performed as detailed above. Nonionic contrast was utilized throughout the procedure for the patient's safety.  At the completion of the procedure, the catheter and sheath were removed and hemostasis in the right wrist obtained by placement of a Vascade arteriotomy closure device. The device was deployed without complication.  Findings of the cerebral angiogram: Injections of the right common femoral artery demonstrated patency of the right common femoral artery and its branches. The catheter was advanced into the right common carotid artery. Injections of the right common carotid artery demonstrate patency of the carotid bifurcation. The catheter was advanced into the right internal carotid artery. Injections of the right internal carotid artery demonstrate unremarkable appearance of the cerebral vasculature with no aneurysm, large vessel occlusion, vascular formation or other vascular anomaly. Arterial, parenchymal and venous phase of the angiogram appear normal. The catheter was advanced into the left common carotid artery. Injections of the left common carotid artery demonstrate patency of the carotid bifurcation. The catheter was advanced into the left internal carotid artery. Injections of the left internal carotid artery demonstrate a posteriorly and inferiorly oriented, 5.9 x 5.5 x 3 mm aneurysm arising along the posterior wall of the internal carotid artery distal to the ophthalmic artery origin with a relatively wide, 5.5 mm neck. These findings are better depicted on the 3-D rotational angiography images. Remaining of the angiogram demonstrates an unremarkable appearance of the cerebral vasculature with no additional aneurysm, large vessel occlusion, vascular formation or other vascular anomaly. Arterial, parenchymal and venous phase of the angiogram appear normal.  The catheter was advanced into the left vertebral artery. Injections of the left vertebral artery demonstrate unremarkable appearance of the cerebral vasculature with no aneurysm, large vessel occlusion, vascular formation or other vascular anomaly. Arterial, parenchymal and venous phase of the angiogram appear normal.  Summary of the procedure: Wide neck, 5.9 mm aneurysm arising from the posterior wall of the left internal carotid artery distal to the ophthalmic artery origin.

## 2025-05-01 NOTE — HISTORY OF PRESENT ILLNESS
[FreeTextEntry1] : KARLA RANDOLPH is an Tajik-speaking 63 year-old female with a PMHx significant for migraines, who presents to the office today for postop evaluation diagnostic cerebral angiogram of LEFT Paraclinoid ICA aneurysm  2022 Patient saw ENT Dr. Gupta for decreased hearing in the RIGHT ear with b/l non-pulsatile whooshing tinnitus. ENT ordered MRA Brain (8/11/23) which revealed 5mm LEFT ICA aneurysm. She was referred to Neurosurgery Dr. Underwood who ordered CTA Head wawo (9/05/23) which demonstrated LEFT Supraclinoid ICA postero-inferiorly oriented aneurysm measuring 4.2 x 4.7 x 4.7 mm. diagnostic cerebral angiogram was offered but patient ultimately opted for conservative management with imaging surveillance. Subsequent f/u MRA Brains (9/05/23 and 2/22/24) showed stability of aneurysm. MRA Brain (3/15/25 @R) however demonstrated enlarged 6 mm inferiorly projecting left Paraclinoid ICA aneurysm. Dr. Underwood referred patient to Dr. Gonzalez for further angiographic evaluation.  4/10/25 - first appt with Dr. Gonzalez. Patient endorses ongoing tinnitus in b/l ears, worst at nighttime when she is laying down. Decreased hearing in R ear unchanged. She also endorses 4yr intermittent dizziness like the room is spinning with disequilibrium, last episode last summer. Patient reportedly told PCP who told patient to go to ED, which she has gone 2x thus far. They "gave me a pill and I felt better." She does not know what they gave her. Patient denies headache, cervicalgia, nausea/vomiting, visual disturbance, numbness/tingling, extremity weakness, or seizure-like activity. Plan for diagnostic cerebral angiogram  4/21/25 - Diagnostic cerebral angio by Dr. Gonzalez  TODAY 4/29/25 - postop appt with Dr. Gonzalez. States she feels fine, has a small bump in R groin with ecchymosis. Patient denies headache, cervicalgia, nausea/vomiting, visual disturbance, numbness/tingling, extremity weakness, or seizure-like activity.   Soc Hx: Never smoker, No EtOH, No known family history of aneurysms  - Care Team - PCP: Dr. Myrna Martínez [id number] [place] [phone]  Neuro: (stopped seeing Dr. Lott, saw a Neurologist in [facility] who has been prescribing migraine and antidepressants for her)  ENT: Dr. Ashley Gupta

## 2025-05-01 NOTE — ASSESSMENT
[FreeTextEntry1] : KARLA RANDOLPH is an Portuguese-speaking 63 year-old female with a PMHx significant for migraines, who presents to the office today for postop evaluation diagnostic cerebral angiogram of LEFT Paraclinoid ICA aneurysm  I have discussed the natural history and treatment options for large cerebral aneurysms with the patient. I explained that a variety of factors must be taken into consideration when managing cerebral aneurysm, namely the lesion size, shape, location, and personal/family history, and patient's age. I informed the patient that the main risk with cerebral aneurysms is rupture, leading to Subarachnoid Hemorrhage. I detailed the consequences of SAH, including its morbidity and mortality. I then presented the options of 1) conservative management with serial imaging, 2) aneurysm repair via endovascular approaches vs microsurgery. I thoroughly discussed the indications, risks, possible complications, and expected outcomes of each treatment option with the patient. In the end, I recommend treatment with stent placement   PLAN: - 5/21/25 Stent placement to LEFT Paraclinoid ICA aneurysm - 5/16 check accumetrics - 5/05 start ASA and Plavix  - return to Dr. Gonzalez clinic to review   The patient was instructed that if they should immediately call 911 or go to the Emergency Department if they experience symptoms of severe thunderclap headache, syncope, unexplained nausea/vomiting, visual changes, seizure-like activity, new weakness or numbness of extremities.   I reviewed and answered all patient questions. Patient verbalizes agreement and understanding with plan of care. Patient verbalizes agreement and understanding with plan of care.  I, Dr. Gonzalez, personally performed the evaluation and management (E/M) services for this established patient who presents today with (a) new problem(s)/exacerbation of (an) existing condition(s). That E/M includes conducting the examination, assessing all new/exacerbated conditions, and establishing a new plan of care. Today, WALT Burentte, was here to observe my evaluation and management services for this new problem/exacerbated condition to be followed going forward.

## 2025-05-01 NOTE — ASSESSMENT
[FreeTextEntry1] : KARLA RANDOLPH is an Slovenian-speaking 63 year-old female with a PMHx significant for migraines, who presents to the office today for postop evaluation diagnostic cerebral angiogram of LEFT Paraclinoid ICA aneurysm  I have discussed the natural history and treatment options for large cerebral aneurysms with the patient. I explained that a variety of factors must be taken into consideration when managing cerebral aneurysm, namely the lesion size, shape, location, and personal/family history, and patient's age. I informed the patient that the main risk with cerebral aneurysms is rupture, leading to Subarachnoid Hemorrhage. I detailed the consequences of SAH, including its morbidity and mortality. I then presented the options of 1) conservative management with serial imaging, 2) aneurysm repair via endovascular approaches vs microsurgery. I thoroughly discussed the indications, risks, possible complications, and expected outcomes of each treatment option with the patient. In the end, I recommend treatment with stent placement   PLAN: - 5/21/25 Stent placement to LEFT Paraclinoid ICA aneurysm - 5/16 check accumetrics - 5/05 start ASA and Plavix  - return to Dr. Gonzalez clinic to review   The patient was instructed that if they should immediately call 911 or go to the Emergency Department if they experience symptoms of severe thunderclap headache, syncope, unexplained nausea/vomiting, visual changes, seizure-like activity, new weakness or numbness of extremities.   I reviewed and answered all patient questions. Patient verbalizes agreement and understanding with plan of care. Patient verbalizes agreement and understanding with plan of care.  I, Dr. Gonzalez, personally performed the evaluation and management (E/M) services for this established patient who presents today with (a) new problem(s)/exacerbation of (an) existing condition(s). That E/M includes conducting the examination, assessing all new/exacerbated conditions, and establishing a new plan of care. Today, WALT Burnette, was here to observe my evaluation and management services for this new problem/exacerbated condition to be followed going forward.

## 2025-05-01 NOTE — HISTORY OF PRESENT ILLNESS
[FreeTextEntry1] : KARLA RANDOLPH is an Maori-speaking 63 year-old female with a PMHx significant for migraines, who presents to the office today for postop evaluation diagnostic cerebral angiogram of LEFT Paraclinoid ICA aneurysm  2022 Patient saw ENT Dr. Gupta for decreased hearing in the RIGHT ear with b/l non-pulsatile whooshing tinnitus. ENT ordered MRA Brain (8/11/23) which revealed 5mm LEFT ICA aneurysm. She was referred to Neurosurgery Dr. Underwood who ordered CTA Head wawo (9/05/23) which demonstrated LEFT Supraclinoid ICA postero-inferiorly oriented aneurysm measuring 4.2 x 4.7 x 4.7 mm. diagnostic cerebral angiogram was offered but patient ultimately opted for conservative management with imaging surveillance. Subsequent f/u MRA Brains (9/05/23 and 2/22/24) showed stability of aneurysm. MRA Brain (3/15/25 @R) however demonstrated enlarged 6 mm inferiorly projecting left Paraclinoid ICA aneurysm. Dr. Underwood referred patient to Dr. Gonzalez for further angiographic evaluation.  4/10/25 - first appt with Dr. Gonzalez. Patient endorses ongoing tinnitus in b/l ears, worst at nighttime when she is laying down. Decreased hearing in R ear unchanged. She also endorses 4yr intermittent dizziness like the room is spinning with disequilibrium, last episode last summer. Patient reportedly told PCP who told patient to go to ED, which she has gone 2x thus far. They "gave me a pill and I felt better." She does not know what they gave her. Patient denies headache, cervicalgia, nausea/vomiting, visual disturbance, numbness/tingling, extremity weakness, or seizure-like activity. Plan for diagnostic cerebral angiogram  4/21/25 - Diagnostic cerebral angio by Dr. Gonzalez  TODAY 4/29/25 - postop appt with Dr. Gonzalez. States she feels fine, has a small bump in R groin with ecchymosis. Patient denies headache, cervicalgia, nausea/vomiting, visual disturbance, numbness/tingling, extremity weakness, or seizure-like activity.   Soc Hx: Never smoker, No EtOH, No known family history of aneurysms  - Care Team - PCP: Dr. Myrna Martínez [address] [place] [phone]  Neuro: (stopped seeing Dr. Lott, saw a Neurologist in [facility] who has been prescribing migraine and antidepressants for her)  ENT: Dr. Ashley Gupta

## 2025-05-01 NOTE — ASSESSMENT
[FreeTextEntry1] : KARLA RANDOLPH is an Turkish-speaking 63 year-old female with a PMHx significant for migraines, who presents to the office today for postop evaluation diagnostic cerebral angiogram of LEFT Paraclinoid ICA aneurysm  I have discussed the natural history and treatment options for large cerebral aneurysms with the patient. I explained that a variety of factors must be taken into consideration when managing cerebral aneurysm, namely the lesion size, shape, location, and personal/family history, and patient's age. I informed the patient that the main risk with cerebral aneurysms is rupture, leading to Subarachnoid Hemorrhage. I detailed the consequences of SAH, including its morbidity and mortality. I then presented the options of 1) conservative management with serial imaging, 2) aneurysm repair via endovascular approaches vs microsurgery. I thoroughly discussed the indications, risks, possible complications, and expected outcomes of each treatment option with the patient. In the end, I recommend treatment with stent placement   PLAN: - 5/21/25 Stent placement to LEFT Paraclinoid ICA aneurysm - 5/16 check accumetrics - 5/05 start ASA and Plavix  - return to Dr. Gonzalez clinic to review   The patient was instructed that if they should immediately call 911 or go to the Emergency Department if they experience symptoms of severe thunderclap headache, syncope, unexplained nausea/vomiting, visual changes, seizure-like activity, new weakness or numbness of extremities.   I reviewed and answered all patient questions. Patient verbalizes agreement and understanding with plan of care. Patient verbalizes agreement and understanding with plan of care.  I, Dr. Gonzalez, personally performed the evaluation and management (E/M) services for this established patient who presents today with (a) new problem(s)/exacerbation of (an) existing condition(s). That E/M includes conducting the examination, assessing all new/exacerbated conditions, and establishing a new plan of care. Today, WALT Burnette, was here to observe my evaluation and management services for this new problem/exacerbated condition to be followed going forward.

## 2025-05-29 NOTE — ASSESSMENT
[FreeTextEntry1] : KARLA RANDOLPH is an Maori-speaking 63 year-old female with a PMHx significant for migraines, who presents to the office today for postop evaluation for L ICA FredX stent placement on 5/21/25. Went to ER overnight with generalized weakness, repeat imaging reassuring and again reviewed by Dr. Gonzalez. Recommended to continue to monitor symptoms and will see her next week.   PLAN: - post op appt with Dr. Gonzalez on 6/3/25   The patient was instructed that if they should immediately call 911 or go to the Emergency Department if they experience symptoms of severe thunderclap headache, syncope, unexplained nausea/vomiting, visual changes, seizure-like activity, new weakness or numbness of extremities.   I reviewed and answered all patient questions. Patient verbalizes agreement and understanding with plan of care. Patient verbalizes agreement and understanding with plan of care.

## 2025-05-29 NOTE — PROCEDURE
[FreeTextEntry1] : ACC: [id number] EXAM: IR PROCEDURE NEURO ORDERED BY: INES CRANE  PROCEDURE DATE: 05/22/2025  INTERPRETATION: Surgeon: Dr. Trudi Johnson  Assistant: NICOL Beckham; NICOL Wilkins  Preoperative diagnosis: Left paraclinoid aneurysm.  Postoperative diagnosis: Successful flow diversion of left paraclinoid aneurysm  Procedure:  1. Ultrasound-guided access of the right common femoral artery 2. Cerebral and cervical angiogram 3. 3-D rotational angiography of the left internal carotid artery with postprocessing on a separate workstation by the interpreting physician 4. intra-arterial verapamil infusion into the left internal carotid artery 5. Endovascular FREDX stent embolization of left paraophthalmic artery aneurysm 6. 3-D dynamic flat panel/Cone beam CT following injections of the left internal carotid artery with postprocessing on a separate workstation by the interpreting physician  Date: 5/22/25  Clinical history: 62-year-old female with past medical history significant for migraine, decreased hearing in the right side with bilateral nonpulsatile tinnitus. 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 Patient underwent cerebral angiography which demonstrated a 5.9 mm aneurysm arising from the posterior wall of the left internal carotid artery distal to the ophthalmic artery origin. The case was discussed in her multidisciplinary neurovascular conference and the consensus was to treat this aneurysm with full diversion. Patient comes today for endovascular treatment of this aneurysm.  Anesthesia: General endotracheal anesthesia was provided by the anesthesiology service for a total of 120 minutes.  Estimated blood loss: 10 cc  Vessel selected and studied:  Right common femoral artery Left common carotid artery Left internal carotid artery   3-D rotational angiography images were performed via injections of the following vessels: Left internal carotid artery  Images were rendered using shaded surface reconstruction. Images were post processed and extensively analyzed by the interpreting physician on a separate workstation. 3-D rotational angiography images were performed to better evaluate the anatomy of the cerebral vasculature in question.  3-D dynamic flat panel/Cone beam CT was performed via injections of the following vessels: Left internal carotid artery  Images were reconstructed and then extensively analyzed by the interpreting physician on a separate workstation. Rotational acquisition for dynamic subpanel computed tomography was performed to better evaluate the anatomy of the cerebral vasculature in question.  Complications: No complications encountered during or immediately following the procedure.  Description of the procedure:  The procedure, its risks, benefits, and alternatives were discussed in detail with the patient. Risks included but were not limited to: Bleeding, infection, nerve injury, vessel injury, hematoma, pain, radiation exposure, alopecia, renal toxicity, renal failure, allergic reaction to contrast, stroke, blindness, paralysis, death, and the need for additional treatments or procedures. All questions were answered and no guarantees were provided. After informed consent the patient was brought to the angiography suite and placed supine on the intraoperative table.  The patient underwent general endotracheal anesthesia.  The right groin was prepped and draped using sterile technique.  Ultrasound-guided access of the right common femoral artery. Ultrasound images demonstrated patency of the right common femoral artery and its branches. Using ultrasound guidance with an arterial micropuncture kit, the right common femoral artery was accessed and a 8 Surinamese 11 cm sheath was placed. Utilizing a 0.035 angled Glidewire, NeuronMax catheter, 6 Surinamese vertebral curve catheter, with additional microcatheters and microwires as detailed below, selective arterial catheterizations were performed as detailed above. Nonionic contrast was utilized for the procedure for the patient's safety.  Findings of the cerebral angiogram:  The catheter was advanced into the left common carotid artery. Injections of the left common carotid artery demonstrate patency of the carotid bifurcation.  The catheter was advanced into the left internal carotid artery. Injections of the left internal carotid artery again demonstrate a posteriorly and inferiorly oriented, 5.9 x 5.5 x 3 mm aneurysm arising along the posterior wall of the internal carotid artery distal to the ophthalmic artery origin with a relatively wide, 5.5 mm neck. These findings are better depicted on the 3-D rotational angiography images. Remaining of the angiogram demonstrates an unremarkable appearance of the cerebral vasculature with no additional aneurysm, large vessel occlusion, vascular formation or other vascular anomaly. Arterial, parenchymal and venous phase of the angiogram appear normal.  Following the diagnostic portion of this exam, intervention was performed as follows.  Boluses of heparin were administered intravenously to maintain an activated clotting time close to 250.  With the diagnostic catheter present in the left internal carotid artery, 10 mg of verapamil were slowly infused into the left internal carotid artery over 10 minutes for the treatment of vasospasm.  Next, we advanced the guide catheter over the diagnostic catheter into the proximal cervical portion of the left internal carotid artery. The diagnostic catheter was removed. Next, a Celestina EX intermediate catheter was advanced over a Headway 27 microcatheter and Synchro 2 microwire into the cavernous portion of the intracranial left internal carotid artery. Next, the microcatheter was advanced over the microwire into the left middle cerebral artery M1 segment. The microwire was subsequently removed.  Next, a 4.5 mm x 25/18 mm FREDX embolization device was advanced through the microcatheter and into the parent vessel across the aneurysm. Following this, the device was then deployed across the the aneurysm using standard technique.  Post stent deployment angiography demonstrated excellent coverage of the aneurysm neck by the flow diverting component of the stent.  To further confirm the apposition of the device to the parent vessel and exact position of the device in relation to the aneurysm and proceeded to perform a cone beam CT following injections of the left internal carotid artery.  Cone beam CT demonstrated excellent apposition of the device to the parent vessel with excellent coverage of the aneurysm by the flow diversion component of the stent.  Final post embolization angiography demonstrated excellent apposition of the stent construct with the parent vessel wall. Parent and branch vessels were otherwise grossly patent, unchanged from previous embolization angiography.  At the completion of the procedure, catheters and sheath were removed and hemostasis in the right groin obtained by placement of a CELT arteriotomy closure device. The device was deployed without complications.  No complications were encountered during or immediately following the procedure. The patient was extubated in the room and was in a stable neurological condition. The patient was transferred to the neurological ICU in a stable condition.  Impression:  Wide neck, 5.9 mm aneurysm arising from the posterior wall of the left internal carotid artery distal to the ophthalmic artery origin.  Successful endovascular FREDX stent embolization of left paraclinoid aneurysm as described above.  No complications.  The patient will remain on DAPT for 6 months.  --- End of Report ---      TRUDI JOHNSON M.D.; Attending Neurologist This document has been electronically signed. May 22 2025 10:35AM

## 2025-05-29 NOTE — HISTORY OF PRESENT ILLNESS
[FreeTextEntry1] : KARLA RANDOLPH is an English-speaking 63 year-old female with a PMHx significant for migraines, who presents to the office today for postop evaluation diagnostic cerebral angiogram of LEFT Paraclinoid ICA aneurysm  2022 Patient saw ENT Dr. Gupta for decreased hearing in the RIGHT ear with b/l non-pulsatile whooshing tinnitus. ENT ordered MRA Brain (8/11/23) which revealed 5mm LEFT ICA aneurysm. She was referred to Neurosurgery Dr. Underwood who ordered CTA Head wawo (9/05/23) which demonstrated LEFT Supraclinoid ICA postero-inferiorly oriented aneurysm measuring 4.2 x 4.7 x 4.7 mm. diagnostic cerebral angiogram was offered but patient ultimately opted for conservative management with imaging surveillance. Subsequent f/u MRA Brains (9/05/23 and 2/22/24) showed stability of aneurysm. MRA Brain (3/15/25 @[facility]) however demonstrated enlarged 6 mm inferiorly projecting left Paraclinoid ICA aneurysm. Dr. Underwood referred patient to Dr. Gonzalez for further angiographic evaluation.  4/10/25 - first appt with Dr. Gonzalez. Patient endorses ongoing tinnitus in b/l ears, worst at nighttime when she is laying down. Decreased hearing in R ear unchanged. She also endorses 4yr intermittent dizziness like the room is spinning with disequilibrium, last episode last summer. Patient reportedly told PCP who told patient to go to ED, which she has gone 2x thus far. They "gave me a pill and I felt better." She does not know what they gave her. Patient denies headache, cervicalgia, nausea/vomiting, visual disturbance, numbness/tingling, extremity weakness, or seizure-like activity. Plan for diagnostic cerebral angiogram  4/21/25 - Diagnostic cerebral angio by Dr. Gonzalez  4/29/25 - postop appt with Dr. Gonzalez. States she feels fine, has a small bump in R groin with ecchymosis. Patient denies headache, cervicalgia, nausea/vomiting, visual disturbance, numbness/tingling, extremity weakness, or seizure-like activity.  5/21/25 - s/p L ICA FredX stent placement  TODAY 5/28/25 - postop appt phone call with NP. She went to [facility] ED last night for generalized weakness and headaches, CTA H&N done, repeated ARU- 350, P2y12- 0, denies any bleeding and reports groin site without bruising or drainage. Reports eating and hydrating well. Takes rizatriptan which provides headache relief.    Soc Hx: Never smoker, No EtOH, No known family history of aneurysms  - Care Team - PCP: Dr. Myrna Martínez [address] [place] [phone]  Neuro: (stopped seeing Dr. Lott, saw a Neurologist in [facility] who has been prescribing migraine and antidepressants for her) ENT: Dr. Ashley Gupta

## 2025-05-29 NOTE — REASON FOR VISIT
[Home] : at home, [unfilled] , at the time of the visit. [Medical Office: ([facility])___] : at the medical office located in  [Telephone (audio)] : This telephonic visit was provided via audio only technology. [Patient preference] : patient preference. [Verbal consent obtained from patient] : the patient, [unfilled] [de-identified] : L ICA paraclinoid aneurysm s/p L ICA FredX stent placement [de-identified] : 5/21/25

## 2025-06-03 NOTE — REASON FOR VISIT
[Spouse] : spouse [de-identified] : L ICA paraclinoid aneurysm s/p L ICA FredX stent placement [de-identified] : 5/21/25

## 2025-06-03 NOTE — HISTORY OF PRESENT ILLNESS
[FreeTextEntry1] : KARLA RANDOLPH is an French-speaking 63 year-old female with a PMHx significant for migraines, who presents to the office today for postop evaluation diagnostic cerebral angiogram of LEFT Paraclinoid ICA aneurysm  2022 Patient saw ENT Dr. Gupta for decreased hearing in the RIGHT ear with b/l non-pulsatile whooshing tinnitus. ENT ordered MRA Brain (8/11/23) which revealed 5mm LEFT ICA aneurysm. She was referred to Neurosurgery Dr. Underwood who ordered CTA Head wawo (9/05/23) which demonstrated LEFT Supraclinoid ICA postero-inferiorly oriented aneurysm measuring 4.2 x 4.7 x 4.7 mm. diagnostic cerebral angiogram was offered but patient ultimately opted for conservative management with imaging surveillance. Subsequent f/u MRA Brains (9/05/23 and 2/22/24) showed stability of aneurysm. MRA Brain (3/15/25 @[facility]) however demonstrated enlarged 6 mm inferiorly projecting left Paraclinoid ICA aneurysm. Dr. Underwood referred patient to Dr. Gonzalez for further angiographic evaluation.  4/10/25 - first appt with Dr. Gonzalez. Patient endorses ongoing tinnitus in b/l ears, worst at nighttime when she is laying down. Decreased hearing in R ear unchanged. She also endorses 4yr intermittent dizziness like the room is spinning with disequilibrium, last episode last summer. Patient reportedly told PCP who told patient to go to ED, which she has gone 2x thus far. They "gave me a pill and I felt better." She does not know what they gave her. Patient denies headache, cervicalgia, nausea/vomiting, visual disturbance, numbness/tingling, extremity weakness, or seizure-like activity. Plan for diagnostic cerebral angiogram  4/21/25 - Diagnostic cerebral angio by Dr. Gonzalez  4/29/25 - postop appt with Dr. Gonzalez. States she feels fine, has a small bump in R groin with ecchymosis. Patient denies headache, cervicalgia, nausea/vomiting, visual disturbance, numbness/tingling, extremity weakness, or seizure-like activity.  5/21/25 - s/p L ICA FredX stent placement  5/28/25 - postop appt phone call with NP. She went to [facility] ED last night for generalized weakness and headaches, CTA H&N done, repeated ARU- 350, P2y12- 0, denies any bleeding and reports groin site without bruising or drainage. Reports eating and hydrating well. Takes rizatriptan which provides headache relief.   TODAY 6/3/25 - post op appt with Dr. Gonzalez. Remains on aspirin and plavix.  generalized headaches and "weak sensation" or feeling tired. No new bruising/swelling at groin site. Denies changes in speech/vision, nausea/vomiting, numbness/tingling in extremities, abnormal movement of extremities.    Soc Hx: Never smoker, No EtOH, No known family history of aneurysms  - Care Team - PCP: Dr. Myrna Martínez [address] [place] [id number] [phone]  Neuro: (stopped seeing Dr. Lott, saw a Neurologist in [facility] who has been prescribing migraine and antidepressants for her) ENT: Dr. Ashley Gupta

## 2025-06-03 NOTE — ASSESSMENT
[FreeTextEntry1] : KARLA RANDOLPH is an Urdu-speaking 63 year-old female with a PMHx significant for migraines, who presents to the office today for postop evaluation for L ICA FredX stent placement on 5/21/25.   PLAN: - continue asa +plavix for 6 months until Nov 2025 -Repeat P2Y12 + ARU 8/21/25 -MRA Head Noncon week of 8/21/25 -follow-up with MD Gonzalez   The patient was instructed that if they should immediately call 911 or go to the Emergency Department if they experience symptoms of severe thunderclap headache, syncope, unexplained nausea/vomiting, visual changes, seizure-like activity, new weakness or numbness of extremities.   I reviewed and answered all patient questions. Patient verbalizes agreement and understanding with plan of care. Patient verbalizes agreement and understanding with plan of care.  I, Dr. Gonzalez, personally performed the evaluation and management (E/M) services for this new patient. That E/M includes conducting the initial examination, assessing all conditions, and establishing the plan of care. Today, Kacy De La Garza NP was here to observe my evaluation and management services for this patient to be followed going forward.

## 2025-06-03 NOTE — REASON FOR VISIT
[Spouse] : spouse [de-identified] : L ICA paraclinoid aneurysm s/p L ICA FredX stent placement [de-identified] : 5/21/25

## 2025-06-03 NOTE — PHYSICAL EXAM
[General Appearance - Alert] : alert [General Appearance - In No Acute Distress] : in no acute distress [Cranial Nerves Oculomotor (III)] : extraocular motion intact [Cranial Nerves Trigeminal (V)] : facial sensation intact symmetrically [Cranial Nerves Glossopharyngeal (IX)] : tongue and palate midline [Cranial Nerves Accessory (XI - Cranial And Spinal)] : head turning and shoulder shrug symmetric [Motor Tone] : muscle tone was normal in all four extremities [Motor Strength] : muscle strength was normal in all four extremities [Sclera] : the sclera and conjunctiva were normal [Extraocular Movements] : extraocular movements were intact [Outer Ear] : the ears and nose were normal in appearance [Neck Appearance] : the appearance of the neck was normal [] : no respiratory distress [Abnormal Walk] : normal gait [Involuntary Movements] : no involuntary movements were seen

## 2025-06-03 NOTE — HISTORY OF PRESENT ILLNESS
[FreeTextEntry1] : KARLA RANDOLPH is an Macedonian-speaking 63 year-old female with a PMHx significant for migraines, who presents to the office today for postop evaluation diagnostic cerebral angiogram of LEFT Paraclinoid ICA aneurysm  2022 Patient saw ENT Dr. Gupta for decreased hearing in the RIGHT ear with b/l non-pulsatile whooshing tinnitus. ENT ordered MRA Brain (8/11/23) which revealed 5mm LEFT ICA aneurysm. She was referred to Neurosurgery Dr. Underwood who ordered CTA Head wawo (9/05/23) which demonstrated LEFT Supraclinoid ICA postero-inferiorly oriented aneurysm measuring 4.2 x 4.7 x 4.7 mm. diagnostic cerebral angiogram was offered but patient ultimately opted for conservative management with imaging surveillance. Subsequent f/u MRA Brains (9/05/23 and 2/22/24) showed stability of aneurysm. MRA Brain (3/15/25 @[facility]) however demonstrated enlarged 6 mm inferiorly projecting left Paraclinoid ICA aneurysm. Dr. Underwood referred patient to Dr. Gonzalez for further angiographic evaluation.  4/10/25 - first appt with Dr. Gonzalez. Patient endorses ongoing tinnitus in b/l ears, worst at nighttime when she is laying down. Decreased hearing in R ear unchanged. She also endorses 4yr intermittent dizziness like the room is spinning with disequilibrium, last episode last summer. Patient reportedly told PCP who told patient to go to ED, which she has gone 2x thus far. They "gave me a pill and I felt better." She does not know what they gave her. Patient denies headache, cervicalgia, nausea/vomiting, visual disturbance, numbness/tingling, extremity weakness, or seizure-like activity. Plan for diagnostic cerebral angiogram  4/21/25 - Diagnostic cerebral angio by Dr. Gonzalez  4/29/25 - postop appt with Dr. Gonzalez. States she feels fine, has a small bump in R groin with ecchymosis. Patient denies headache, cervicalgia, nausea/vomiting, visual disturbance, numbness/tingling, extremity weakness, or seizure-like activity.  5/21/25 - s/p L ICA FredX stent placement  5/28/25 - postop appt phone call with NP. She went to [facility] ED last night for generalized weakness and headaches, CTA H&N done, repeated ARU- 350, P2y12- 0, denies any bleeding and reports groin site without bruising or drainage. Reports eating and hydrating well. Takes rizatriptan which provides headache relief.   TODAY 6/3/25 - post op appt with Dr. Gonzalez. Remains on aspirin and plavix.  generalized headaches and "weak sensation" or feeling tired. No new bruising/swelling at groin site. Denies changes in speech/vision, nausea/vomiting, numbness/tingling in extremities, abnormal movement of extremities.    Soc Hx: Never smoker, No EtOH, No known family history of aneurysms  - Care Team - PCP: Dr. Myrna Martínez [address] [place] [phone]  Neuro: (stopped seeing Dr. Lott, saw a Neurologist in [facility] who has been prescribing migraine and antidepressants for her) ENT: Dr. Ashley Gupta

## 2025-06-03 NOTE — ASSESSMENT
[FreeTextEntry1] : KARLA RANDOLPH is an Frisian-speaking 63 year-old female with a PMHx significant for migraines, who presents to the office today for postop evaluation for L ICA FredX stent placement on 5/21/25.   PLAN: - continue asa +plavix for 6 months until Nov 2025 -Repeat P2Y12 + ARU 8/21/25 -MRA Head Noncon week of 8/21/25 -follow-up with MD Gonzalez   The patient was instructed that if they should immediately call 911 or go to the Emergency Department if they experience symptoms of severe thunderclap headache, syncope, unexplained nausea/vomiting, visual changes, seizure-like activity, new weakness or numbness of extremities.   I reviewed and answered all patient questions. Patient verbalizes agreement and understanding with plan of care. Patient verbalizes agreement and understanding with plan of care.  I, Dr. Gonzalez, personally performed the evaluation and management (E/M) services for this new patient. That E/M includes conducting the initial examination, assessing all conditions, and establishing the plan of care. Today, Kacy De La Garza NP was here to observe my evaluation and management services for this patient to be followed going forward.